# Patient Record
Sex: FEMALE | Race: WHITE | Employment: FULL TIME | URBAN - METROPOLITAN AREA
[De-identification: names, ages, dates, MRNs, and addresses within clinical notes are randomized per-mention and may not be internally consistent; named-entity substitution may affect disease eponyms.]

---

## 2020-04-20 ENCOUNTER — TELEPHONE (OUTPATIENT)
Dept: GYNECOLOGY | Age: 40
End: 2020-04-20

## 2020-04-20 NOTE — TELEPHONE ENCOUNTER
Patient tried requesting disc, and was told that our office will have to request.     Medical records  [id number]

## 2020-04-22 NOTE — TELEPHONE ENCOUNTER
Dr. Belem Gotti does not need the disc at this time, we can schedule a virtual visit for next week, I hogan dpt and LMTCB to schedule virtual visit

## 2020-04-28 ENCOUNTER — VIRTUAL VISIT (OUTPATIENT)
Dept: GYNECOLOGY | Age: 40
End: 2020-04-28

## 2020-04-28 DIAGNOSIS — N83.202 CYST OF LEFT OVARY: Primary | ICD-10-CM

## 2020-04-28 DIAGNOSIS — N70.11 HYDROSALPINX: ICD-10-CM

## 2020-04-28 NOTE — PATIENT INSTRUCTIONS
Availink Activation Thank you for requesting access to [employer]. Please follow the instructions below to securely access and download your online medical record. Availink allows you to send messages to your doctor, view your test results, renew your prescriptions, schedule appointments, and more. How Do I Sign Up? 1. In your internet browser, go to [email or web address]. 2. Click on the First Time User? Click Here link in the Sign In box. You will see the New Member Sign Up page. 3. Enter your Availink Access Code exactly as it appears below. You will not need to use this code after youve completed the sign-up process. If you do not sign up before the expiration date, you must request a new code. Availink Access Code: [email or web address] Expires: 2020  9:05 AM (This is the date your Availink access code will ) 4. Enter the last four digits of your Social Security Number (xxxx) and Date of Birth (mm/dd/yyyy) as indicated and click Submit. You will be taken to the next sign-up page. 5. Create a Availink ID. This will be your Availink login ID and cannot be changed, so think of one that is secure and easy to remember. 6. Create a Availink password. You can change your password at any time. 7. Enter your Password Reset Question and Answer. This can be used at a later time if you forget your password. 8. Enter your e-mail address. You will receive e-mail notification when new information is available in [address]. 9. Click Sign Up. You can now view and download portions of your medical record. 10. Click the Download Summary menu link to download a portable copy of your medical information. Additional Information If you have questions, please visit the Frequently Asked Questions section of the Availink website at [email or web address] Remember, Availink is NOT to be used for urgent needs. For medical emergencies, dial 911.

## 2020-04-28 NOTE — PROGRESS NOTES
New Patient, Referred by Dr. Wing Vilchis, Left adnexal mass, virtual visit, no vitals taken    1. Have you been to the ER, urgent care clinic since your last visit? Hospitalized since your last visit?  no    2. Have you seen or consulted any other health care providers outside of the [address] [place] [address] since your last visit? Include any pap smears or colon screening.    no

## 2020-04-29 PROBLEM — N83.202 CYST OF LEFT OVARY: Status: ACTIVE | Noted: 2020-04-29

## 2020-04-29 PROBLEM — N70.11 HYDROSALPINX: Status: ACTIVE | Noted: 2020-04-29

## 2020-04-29 NOTE — PROGRESS NOTES
Akil Steiner is a 37 y.o. female who was seen by synchronous (real-time) audio-video technology on 2020    [address] [facility],[address] [facility] [address]  P [phone]  F [phone]    Office Note  Patient ID:  Name:  Akil Steiner  MRN:  [id number]  :  1980/39 y.o. Date:  2020      HISTORY OF PRESENT ILLNESS:  Ms. Akil Steiner is a 37 y.o.  premenopausal female who presents in consultation from Dr. Matias Larsen for a hydrosalpinx and ovarian cyst for a second opinion. The patient reports that she was first diagnosed with an ovarian cyst at the time of an MRI of her lower spine for nerve related issues. She subsequently underwent a pelvic ultrasound and has been followed by Dr. Matias Larsen. Her pelvic ultrasounds are below, dating in 2019, 2019, and 3/2020. The patient denies all complaints. Denies pelvic pain or bloating. Denies change in appetite or bowel habits. Reports regular menses. Denies CP, SOB, fevers, chills, or urinary symptoms. Pertinent PMH/PSH: scoliosis      Active, no restrictions. Imaging Review:   Pelvic ultrasound 3/10/2020: Impression: The uterus appears normal in size and shape. The endometrium appears slightly inhomogeneous 10.1mm. A swirling motion was recognized within the endometrium during real-time scanning. A complex structure with hyperechoic and anechoic components, and no internal vascular flow, is recognized in the right ovary (1.9 x 1.7 x 1.8cms). A small complex cystic structure with an echogenic focus along the outer border is recognized within the left ovary measuring 1.9 x 1.7 x 1.7cm. No internal vascular flow is noted in this structure. A hypoechoic structure is also noted in the left ovary measuring 2.4 x 1.9 x 2.5cm. No internal vascular flow is recognized in this structure with color doppler.  An elongated cystic structure without internal vascular flow is again recognized in the left adnexa measuring 4.1 x 4.8 x 1.5cm (previously 3.5 x 3.5 x 1.8cm). A scan amount of free fluid is recognized in the cul-de-sac. Pelvic ultrasound 8/27/2019:  Impression: The uterus appears normal in size and shape. Nabothian cysts are recognized in the cervix. The endometrium measures 8.8mms. The right ovary appears normal. The left ovary contains two hypoechoic structures within. One measuring 1.5 x 1.9 x 1.3cms and a smaller hypoechoic structure that also contains calcifications is measuring 1.3 x 1.2 x 1.0cms. No internal flow is recognized within either of these structures with color doppler. A cystic tube like structure with septations and internal flow is still recognized medial and inferior to the left ovary measuring 3.5 x 3.5 x 1.8cm (previously measured 4.1 x 4.4 x 1.9cm). Findings are suspicious for hydrosalpinx. No free fluid is seen. Pelvic ultrasound 5/21/2019:  Impression: The uterus appears normal in size and shape. Multiple nabothian cysts are recognized in the cervix. The endometrium measures 9.4mm. A small complex cystic structure with peripheral flow is recognized within the right ovary (1.7 x 1.5 x 1.3). An elongated cystic structure with a few internal loculations is recognized within the left ovary measuring 1.7 x 0.8 x 1.4cm. A small hypoechoic structure with calcifications is also recognized within the left ovary measuring 1.3 x 1.1 x 0.7cm. No internal flow is recognized within either of these structures with color doppler. A cystic tube like structure with septations and non internal flow is still recognized medial and inferior to the left ovary measuring 4.1 x 4.4 x 1.9cm (previously 4.3 x 1.8 x 3.7cm). Findings are suspicious for hydrosalpinx. No free fluid is recognized in the cul-de-sac. ROS:  A comprehensive review of systems was negative except for that written in the History of Present Illness. , 10 point ROS    OB/GYN ROS:  Patient denies significant menstrual problems.     ECOG ndGndrndanddndend:nd nd2nd Problem List:  Patient Active Problem List    Diagnosis Date Noted    Cyst of left ovary 04/29/2020    Hydrosalpinx 04/29/2020     PMH:  Past Medical History:   Diagnosis Date    Scoliosis       PSH:  Past Surgical History:   Procedure Laterality Date    HX CERVICAL FUSION      14years old, spinal fusion surgery    HX ORTHOPAEDIC  2018    orthoscopic knee surgery X2      Social History:  Social History     Tobacco Use    Smoking status: Never Smoker    Smokeless tobacco: Never Used   Substance Use Topics    Alcohol use: Not on file      Family History:  Family History   Problem Relation Age of Onset    Cancer Father         esophageal cancer    Cancer Maternal Grandmother         possibly started as lung cancer      Medications: (reviewed)  No current outpatient medications on file. No current facility-administered medications for this visit. Allergies: (reviewed)  Allergies   Allergen Reactions    Penicillins Hives        Gyn History:   Last pap: 2 years ago, normal  History of abnormal pap: denies  Menses: regular  Contraception: none currently    OBJECTIVE:    *deferred today given video-conference visit for ongoing COVID-19 pandemic*      Lab Date as available:    No results found for: WBC, HGB, HCT, PLT, MCV, HGBEXT, HCTEXT, PLTEXT  No results found for: NA, K, CL, CO2, AGAP, GLU, BUN, CREA, BUCR, GFRAA, GFRNA, CA    5/2019:  Ca-125 = 16  FSH = 3.9    IMPRESSION/PLAN:    Ms. Mandie Barber is a 37 y.o. female with a working diagnosis of ovarian cysts and left hydrosalpinx. Problems:     Patient Active Problem List    Diagnosis Date Noted    Cyst of left ovary 04/29/2020    Hydrosalpinx 04/29/2020       I reviewed Ms. Екатерина Fabian's course to date, including her medical records, recent studies, physical exam, and review of symptoms. The patient presents for a second opinion.  I do not have the images to review personally, but I have 3 ultrasound reports from 5/2019, 8/2019, and now 3/2020. After reviewing all reports, her cyst and hydrosalpinx appear normal. In fact, I also discussed that the cysts they see are likely functional and not even indicative of the same cyst persisting. The patient is pre-menopausal and has regular menses, so she will have a normal cyst and ovulation each month and her cysts may simply be a result of ovulation and involution. In regard to her hydrosalpinx, this appears stable over the last year. Her difference in measurements are all <1cm which is simply related to measuring margin of error. Normal Ca-125 of 16.1 (5/2019). Reassured patient that given her ultrasound findings and normal Ca-125 that I believe her risk of malignancy is low (~1% or less). I discussed a few management options with the patient: 1) Continued observation; 2) MRI pelvis with repeat Ca-125 and HE-4; 3) Surgical resection. In regard to surgical resection, I do not believe this is required; but if the patient were to desire to have these out of her body it would be reasonable to consider a bilateral salpingectomy and cystectomy or oophorectomy. The patient is not interested in surgery, and that is appropriate. In regard to continued observation, I discussed a possible MRI with Ca-125 and HE-4 now or a repeat ultrasound or MRI in 6 months with Ca-125 and HE-4 at that time. I discussed the role of the MRI as the gold-standard in imaging as an 'extra' piece of information to reassure the findings of the ultrasound. While her Ca-125 is normal, I discussed the role of HE-4 as another piece of information. I do not believe that an MRI or HE-4 is required, but they would add to the information about her risk; which albeit is low as stated above. Overall I reassured the patient today about her course with Dr. Mari Simental, and we have decided that I will send this visit to Dr. Mari Smiental and they can formulate a plan moving forward; and I will be involved for any further questions or concerns. She is undecided today in regard to an MRI or HE-4 and will discuss these with Dr. Matias Larsen. She did ask specifically about how long these needed to be followed. I discussed that if these areas are stable over two years, then she could probably get an ultrasound once a year for a couple of years and then she could safely stop following these areas. All questions and concerns were addressed with the patient and she is comfortable with the plan. Defined Sensitive Document    >50% of total time allocated to visit dedicated to counseling, 50 minutes total.    Signed By: Palak Rogers MD     4/29/2020/8:45 AM         Pursuant to the emergency declaration unde the [facility], [address] waiver authority and the Coronavirus Preparedness and Response Supplemental Appropriations Act, this Virtual Visit was conducted, with patient's consent, to reduce the patient's risk of exposure to COVID-19 and provide continuity of care for an established patient. Services were provided through a video synchronous discussion virtually to substitute for in-person clinic visit.      I spent at least 45 minutes with this new patient, and >50% of the time was spent counseling and/or coordinating care regarding ovarian cyst, hydrosalpinx    Palak Rogers MD

## 2021-03-24 ENCOUNTER — TRANSCRIBE ORDER (OUTPATIENT)
Dept: SCHEDULING | Age: 41
End: 2021-03-24

## 2021-03-24 DIAGNOSIS — R19.07 GENERALIZED ABDOMINAL OR PELVIC SWELLING OR MASS OR LUMP: Primary | ICD-10-CM

## 2021-04-05 ENCOUNTER — TRANSCRIBE ORDER (OUTPATIENT)
Dept: SCHEDULING | Age: 41
End: 2021-04-05

## 2021-04-05 ENCOUNTER — HOSPITAL ENCOUNTER (OUTPATIENT)
Dept: MRI IMAGING | Age: 41
Discharge: HOME | End: 2021-04-05
Payer: COMMERCIAL

## 2021-04-05 DIAGNOSIS — R19.07 GENERALIZED ABDOMINAL OR PELVIC SWELLING OR MASS OR LUMP: ICD-10-CM

## 2021-04-05 DIAGNOSIS — R19.07 GENERALIZED ABDOMINAL OR PELVIC SWELLING OR MASS OR LUMP: Primary | ICD-10-CM

## 2021-04-05 PROCEDURE — 72197 MRI PELVIS W/O & W/DYE: CPT | Performed by: OBSTETRICS & GYNECOLOGY

## 2021-04-05 RX ORDER — GADOTERATE MEGLUMINE 376.9 MG/ML
15 INJECTION INTRAVENOUS
Status: COMPLETED | OUTPATIENT
Start: 2021-04-05 | End: 2021-04-05

## 2021-04-05 RX ADMIN — GADOTERATE MEGLUMINE 15 ML: 376.9 INJECTION INTRAVENOUS at 14:00

## 2022-02-26 NOTE — LETTER
4/29/2020 Patient: Mallory Bennett YOB: 1980 Date of Visit: 4/28/2020 Kalyan Flores Mari Simental, [address] [facility] [address] [place] [id number] VIA Facsimile: [phone] Dear Kayden Brown. Mari Simental MD, Thank you for referring Ms. Latasha Fabian to [address] [place] [address] for evaluation. My notes for this consultation are attached. I spoke at length with Ms. Mallory Bennett regarding her cysts. My discussion focused mainly on continued observation versus surgery. She is apprehensive about any surgery, and I don't feel that surgery is required. I believe continued observation every 6 months for now is appropriate. I go into more detail in my note as to the length and duration that I discussed with the patient. She will ultimately follow-up with you as to how she wants to proceed. But I am in agreement with you that continued observation is the best course and I reassured her that her risk of a malignancy is low, <1%. If you have questions, please do not hesitate to call me. I look forward to following your patient along with you.  
 
 
Sincerely, 
 
Debra Cabrera MD 
 
 

Rosalinda Lewis

## 2022-03-18 PROBLEM — N83.202 CYST OF LEFT OVARY: Status: ACTIVE | Noted: 2020-04-29

## 2022-03-18 PROBLEM — N70.11 HYDROSALPINX: Status: ACTIVE | Noted: 2020-04-29

## 2024-10-13 ENCOUNTER — HOSPITAL ENCOUNTER (EMERGENCY)
Facility: HOSPITAL | Age: 44
Discharge: HOME | End: 2024-10-13
Attending: EMERGENCY MEDICINE
Payer: COMMERCIAL

## 2024-10-13 ENCOUNTER — APPOINTMENT (OUTPATIENT)
Facility: HOSPITAL | Age: 44
End: 2024-10-13
Payer: COMMERCIAL

## 2024-10-13 VITALS
RESPIRATION RATE: 19 BRPM | DIASTOLIC BLOOD PRESSURE: 80 MMHG | SYSTOLIC BLOOD PRESSURE: 119 MMHG | OXYGEN SATURATION: 92 % | HEIGHT: 65 IN | TEMPERATURE: 98.8 F | HEART RATE: 90 BPM | WEIGHT: 167.77 LBS | BODY MASS INDEX: 27.95 KG/M2

## 2024-10-13 DIAGNOSIS — J18.9 PNEUMONIA OF RIGHT LOWER LOBE DUE TO INFECTIOUS ORGANISM: ICD-10-CM

## 2024-10-13 DIAGNOSIS — H66.90 ACUTE OTITIS MEDIA, UNSPECIFIED OTITIS MEDIA TYPE: Primary | ICD-10-CM

## 2024-10-13 LAB
ALBUMIN SERPL-MCNC: 3.6 G/DL (ref 3.5–5)
ALBUMIN/GLOB SERPL: 0.8 (ref 1.1–2.2)
ALP SERPL-CCNC: 109 U/L (ref 45–117)
ALT SERPL-CCNC: 38 U/L (ref 12–78)
ANION GAP SERPL CALC-SCNC: 9 MMOL/L (ref 2–12)
AST SERPL-CCNC: 20 U/L (ref 15–37)
BASOPHILS # BLD: 0 K/UL (ref 0–0.1)
BASOPHILS NFR BLD: 0 % (ref 0–1)
BILIRUB SERPL-MCNC: 0.6 MG/DL (ref 0.2–1)
BUN SERPL-MCNC: 9 MG/DL (ref 6–20)
BUN/CREAT SERPL: 12 (ref 12–20)
CALCIUM SERPL-MCNC: 9.6 MG/DL (ref 8.5–10.1)
CHLORIDE SERPL-SCNC: 101 MMOL/L (ref 97–108)
CO2 SERPL-SCNC: 27 MMOL/L (ref 21–32)
CREAT SERPL-MCNC: 0.74 MG/DL (ref 0.55–1.02)
DIFFERENTIAL METHOD BLD: ABNORMAL
EKG ATRIAL RATE: 98 BPM
EKG DIAGNOSIS: NORMAL
EKG P AXIS: 54 DEGREES
EKG P-R INTERVAL: 144 MS
EKG Q-T INTERVAL: 330 MS
EKG QRS DURATION: 98 MS
EKG QTC CALCULATION (BAZETT): 421 MS
EKG R AXIS: -39 DEGREES
EKG T AXIS: 33 DEGREES
EKG VENTRICULAR RATE: 98 BPM
EOSINOPHIL # BLD: 0.1 K/UL (ref 0–0.4)
EOSINOPHIL NFR BLD: 1 % (ref 0–7)
ERYTHROCYTE [DISTWIDTH] IN BLOOD BY AUTOMATED COUNT: 12 % (ref 11.5–14.5)
GLOBULIN SER CALC-MCNC: 4.3 G/DL (ref 2–4)
GLUCOSE SERPL-MCNC: 101 MG/DL (ref 65–100)
HCT VFR BLD AUTO: 43.7 % (ref 35–47)
HGB BLD-MCNC: 14.9 G/DL (ref 11.5–16)
IMM GRANULOCYTES # BLD AUTO: 0.1 K/UL (ref 0–0.04)
IMM GRANULOCYTES NFR BLD AUTO: 1 % (ref 0–0.5)
LYMPHOCYTES # BLD: 1.7 K/UL (ref 0.8–3.5)
LYMPHOCYTES NFR BLD: 18 % (ref 12–49)
MCH RBC QN AUTO: 28.3 PG (ref 26–34)
MCHC RBC AUTO-ENTMCNC: 34.1 G/DL (ref 30–36.5)
MCV RBC AUTO: 82.9 FL (ref 80–99)
MONOCYTES # BLD: 0.7 K/UL (ref 0–1)
MONOCYTES NFR BLD: 8 % (ref 5–13)
NEUTS SEG # BLD: 6.8 K/UL (ref 1.8–8)
NEUTS SEG NFR BLD: 72 % (ref 32–75)
NRBC # BLD: 0 K/UL (ref 0–0.01)
NRBC BLD-RTO: 0 PER 100 WBC
PLATELET # BLD AUTO: 415 K/UL (ref 150–400)
PMV BLD AUTO: 8.8 FL (ref 8.9–12.9)
POTASSIUM SERPL-SCNC: 3.9 MMOL/L (ref 3.5–5.1)
PROT SERPL-MCNC: 7.9 G/DL (ref 6.4–8.2)
RBC # BLD AUTO: 5.27 M/UL (ref 3.8–5.2)
SODIUM SERPL-SCNC: 137 MMOL/L (ref 136–145)
TROPONIN I SERPL HS-MCNC: <4 NG/L (ref 0–51)
WBC # BLD AUTO: 9.3 K/UL (ref 3.6–11)

## 2024-10-13 PROCEDURE — 6370000000 HC RX 637 (ALT 250 FOR IP): Performed by: PHYSICIAN ASSISTANT

## 2024-10-13 PROCEDURE — 85025 COMPLETE CBC W/AUTO DIFF WBC: CPT

## 2024-10-13 PROCEDURE — 99285 EMERGENCY DEPT VISIT HI MDM: CPT

## 2024-10-13 PROCEDURE — 93010 ELECTROCARDIOGRAM REPORT: CPT | Performed by: SPECIALIST

## 2024-10-13 PROCEDURE — 80053 COMPREHEN METABOLIC PANEL: CPT

## 2024-10-13 PROCEDURE — 6360000002 HC RX W HCPCS: Performed by: PHYSICIAN ASSISTANT

## 2024-10-13 PROCEDURE — 84484 ASSAY OF TROPONIN QUANT: CPT

## 2024-10-13 PROCEDURE — 36415 COLL VENOUS BLD VENIPUNCTURE: CPT

## 2024-10-13 PROCEDURE — 71045 X-RAY EXAM CHEST 1 VIEW: CPT

## 2024-10-13 PROCEDURE — 93005 ELECTROCARDIOGRAM TRACING: CPT | Performed by: EMERGENCY MEDICINE

## 2024-10-13 RX ADMIN — ALBUTEROL SULFATE 1 DOSE: 2.5 SOLUTION RESPIRATORY (INHALATION) at 13:40

## 2024-10-13 ASSESSMENT — LIFESTYLE VARIABLES
HOW OFTEN DO YOU HAVE A DRINK CONTAINING ALCOHOL: NEVER
HOW MANY STANDARD DRINKS CONTAINING ALCOHOL DO YOU HAVE ON A TYPICAL DAY: PATIENT DOES NOT DRINK

## 2024-10-13 NOTE — ED TRIAGE NOTES
Patient arrives with c/o cough for a few weeks, was diagnosed with bronchitis. Was seen at pcp and was sent here for xray and blood work.

## 2024-10-13 NOTE — DISCHARGE INSTRUCTIONS
Contact ENT in the morning, and schedule an appointment, let them know you were seen in the emergency department.  Likely, this is viral pneumonia, monitor your pulse oximetry at home, and your symptoms, if you have worsening shortness of breath, cough, or if you develop a fever, you should be reevaluated.  Contact your primary care office tomorrow, and let them know the results of the evaluation today, recommend scheduling appointment for follow-up this week.  I would continue the Mucinex, Flonase, and consider Sudafed.  You should use the inhaler that has been provided to you, it may help if you use it during the day, as opposed to only at bedtime.

## 2024-10-13 NOTE — ED PROVIDER NOTES
[facility]  EMERGENCY DEPARTMENT ENCOUNTER      Pt Name: Mary Grant  MRN: [id number]  Birthdate 1980  Date of evaluation: 10/13/2024  Provider: Maliha Hill PA-C    CHIEF COMPLAINT       Chief Complaint   Patient presents with    Cough         HISTORY OF PRESENT ILLNESS   (Location/Symptom, Timing/Onset, Context/Setting, Quality, Duration, Modifying Factors, Severity)  Note limiting factors.   The patient is a 44-year-old female presents emergency department after being seen by her primary care provider's urgent care clinic today.    The patient first became ill approximately 1 month ago, when her  and son had a viral upper respiratory infection, and then she had it.  She seemed to improve, as it ran its course, however about a week and a half ago she had recurring symptoms of cough, nasal congestion, posterior nasal drainage, and feeling some shortness of breath.  The patient was seen by her primary care, diagnosed with viral upper respiratory illness, she also was felt to have some sinusitis at some point, and was given a Medrol Dosepak.  The medications did not seem to help very much, and the patient's symptoms persisted.  She was then seen Wednesday, on October 8, and an x-ray was done in the office, which showed to be clear.    During this time, the patient has had a cough with congestion, but all her secretions are clear, the cough is mostly nonproductive.  She has posterior nasal drainage that is also clear.  She has not had a fever.  Over the past several days, she did develop left ear pain.  She was reevaluated after the visit on October 8 on October 12, at that time, she was diagnosed with left otitis media, and given Ciprodex drops.  The patient is attempting to avoid oral antibiotics, because when she was in college, the last time she took an antibiotic, she took 1 dose and it caused her to have a vaginal yeast infection that could not clear for approximately 1 year.    She is

## 2024-12-10 ENCOUNTER — HOSPITAL ENCOUNTER (OUTPATIENT)
Age: 44
Discharge: HOME | End: 2024-12-13
Payer: COMMERCIAL

## 2024-12-10 DIAGNOSIS — J18.9 UNRESOLVED PNEUMONIA: ICD-10-CM

## 2024-12-10 PROCEDURE — 71046 X-RAY EXAM CHEST 2 VIEWS: CPT

## 2025-08-22 ENCOUNTER — HOSPITAL ENCOUNTER (OUTPATIENT)
Age: 45
Discharge: HOME | End: 2025-08-25
Payer: COMMERCIAL

## 2025-08-22 DIAGNOSIS — J40 BRONCHITIS: ICD-10-CM

## 2025-08-22 PROCEDURE — 71046 X-RAY EXAM CHEST 2 VIEWS: CPT
